# Patient Record
Sex: MALE | Race: WHITE | NOT HISPANIC OR LATINO
[De-identification: names, ages, dates, MRNs, and addresses within clinical notes are randomized per-mention and may not be internally consistent; named-entity substitution may affect disease eponyms.]

---

## 2019-04-16 ENCOUNTER — TRANSCRIPTION ENCOUNTER (OUTPATIENT)
Age: 74
End: 2019-04-16

## 2022-04-22 PROBLEM — Z00.00 ENCOUNTER FOR PREVENTIVE HEALTH EXAMINATION: Status: ACTIVE | Noted: 2022-04-22

## 2022-05-23 ENCOUNTER — APPOINTMENT (OUTPATIENT)
Dept: ORTHOPEDIC SURGERY | Facility: CLINIC | Age: 77
End: 2022-05-23
Payer: MEDICARE

## 2022-05-23 VITALS — WEIGHT: 128 LBS | BODY MASS INDEX: 23.55 KG/M2 | HEIGHT: 62 IN

## 2022-05-23 PROCEDURE — 99214 OFFICE O/P EST MOD 30 MIN: CPT

## 2022-05-23 NOTE — HISTORY OF PRESENT ILLNESS
[Gradual] : gradual [Result of repetitive motion] : result of repetitive motion [8] : 8 [1] : 2 [Dull/Aching] : dull/aching [Occasional] : occasional [Stairs] : stairs [de-identified] : 5/23/22: Tried PT without relief.  Cont pain.\par \par Previous doc:\par 5/20/20: Mr. Agustin Puri, a 74-year-old male, presents today for right hip pain intermittent for several years. States he was dancing at his daughters wedding in 2016 which helped his pain and since then whenever he gets pain it is relieved by exercise. Has period of pain daily but gets better with activity. Starts lateral hip and radiates to right ant thigh and knee. No treatments other than home exercises. No groin pain. 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 Minimal pain. Did not start PT.\par 9/13 rt hip is still giving him pain - moderate pain but feel it does affect him and prevents him from doing things - occ mobic helps but tries not to take it everyday -\par 11/8/21: He continues to have hip pain. He was under the impression that he would be getting his hip injection today.\par 12/14/21: Hip inj helped (11/23). Min pain at this time.\par 3/14/22: Hip inj only helped for 2 weeks. [] : no

## 2022-05-23 NOTE — ASSESSMENT
[FreeTextEntry1] : 5/20/20: Adv OA right hip with poor gait but denies severe pain. 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 Discussed options and will try PT and NSAIDs to start.\par 6/22/20: Has minimal pain with occasional Mobic. He is functioning very well at this time and is not interested in further treatment.\par 9/13/21 cont to have pain- affecting his life-- I will plan for inj and PT - and we did discuss DUKE but he will be on Chemo for the rest of his life so will have to discuss with oncologist\par 11/8/21: He has not spoken to his oncologist about the DUKE yet. He has a bone marrow biopsy 11/16/21. planning hip injection\par 12/14/21: Doing well since hip inj - will cont to monitor. Trying to avoid DUKE due to ongoing chemo for AML.\par 3/14/22: Only 2 weeks relief from inj - not a candidate for DUKE at this time due to low WBC count from AML and is on lifelong chemo. Discussed repeat cortisone vs PRP - for now will try some formal PT and he will discuss possibility of DUKE with his oncologist.\par \par 5/23/22: No relief with PT.  He discussed possibility of DUKE with his oncologist and they said his WBC numbers are trending up and could possibly have surgery in the fall.  For now will try repeat hip inj before his vacation in july.  Understands he cannot have DUKE for 3 months after the inj.

## 2022-05-23 NOTE — DISCUSSION/SUMMARY
[de-identified] : We discussed hip injection and its diagnostic and therapeutic benefits.  In the operating room I will inject a combination of Depomedrol and Marcaine.  The patient will keep a log of their pain relief after the injection.  We will discuss the benefit at a one to two weeks follow-up.  Patient made aware that they may be referred to pain management for this injection\par  Yes

## 2022-05-24 DIAGNOSIS — M79.10 MYALGIA, UNSPECIFIED SITE: ICD-10-CM

## 2022-07-09 ENCOUNTER — NON-APPOINTMENT (OUTPATIENT)
Age: 77
End: 2022-07-09

## 2022-07-11 ENCOUNTER — APPOINTMENT (OUTPATIENT)
Dept: ORTHOPEDIC SURGERY | Facility: CLINIC | Age: 77
End: 2022-07-11

## 2022-07-14 ENCOUNTER — APPOINTMENT (OUTPATIENT)
Age: 77
End: 2022-07-14

## 2022-07-14 PROCEDURE — J3490M: CUSTOM

## 2022-07-14 PROCEDURE — 73525 CONTRAST X-RAY OF HIP: CPT | Mod: 26,RT

## 2022-07-14 PROCEDURE — 27093 INJECTION FOR HIP X-RAY: CPT | Mod: RT

## 2022-08-08 ENCOUNTER — APPOINTMENT (OUTPATIENT)
Dept: ORTHOPEDIC SURGERY | Facility: CLINIC | Age: 77
End: 2022-08-08

## 2022-08-08 VITALS — HEIGHT: 62 IN | BODY MASS INDEX: 23.55 KG/M2 | WEIGHT: 128 LBS

## 2022-08-08 DIAGNOSIS — Z87.891 PERSONAL HISTORY OF NICOTINE DEPENDENCE: ICD-10-CM

## 2022-08-08 DIAGNOSIS — E78.00 PURE HYPERCHOLESTEROLEMIA, UNSPECIFIED: ICD-10-CM

## 2022-08-08 DIAGNOSIS — M16.11 UNILATERAL PRIMARY OSTEOARTHRITIS, RIGHT HIP: ICD-10-CM

## 2022-08-08 DIAGNOSIS — C80.1 MALIGNANT (PRIMARY) NEOPLASM, UNSPECIFIED: ICD-10-CM

## 2022-08-08 PROCEDURE — 99215 OFFICE O/P EST HI 40 MIN: CPT

## 2022-08-08 NOTE — DISCUSSION/SUMMARY
[de-identified] : The patient was advised of the diagnosis.  The natural history of the pathology was explained in full to the patient in layman's terms. All questions were answered.  The risks and benefits of surgical and non-surgical treatment alternatives were explained in full to the patient.\par \par Entered by Rachel Sommers acting as scribe.\par

## 2022-08-08 NOTE — HISTORY OF PRESENT ILLNESS
[Gradual] : gradual [Result of repetitive motion] : result of repetitive motion [1] : 2 [0] : 0 [Dull/Aching] : dull/aching [Occasional] : occasional [Stairs] : stairs [de-identified] : 8/8/22: Nearly 4 weeks s/p R IA hip injection doing well with minimal pain at this time. Thinking about DUKE in the fall\par \par Previous doc:\par 5/20/20: Mr. Agustin Puri, a 74-year-old male, presents today for right hip pain intermittent for several years. States he was dancing at his daughters wedding in 2016 which helped his pain and since then whenever he gets pain it is relieved by exercise. Has period of pain daily but gets better with activity. Starts lateral hip and radiates to right ant thigh and knee. No treatments other than home exercises. No groin pain. 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 Minimal pain. Did not start PT.\par 9/13 rt hip is still giving him pain - moderate pain but feel it does affect him and prevents him from doing things - occ mobic helps but tries not to take it everyday -\par 11/8/21: He continues to have hip pain. He was under the impression that he would be getting his hip injection today.\par 12/14/21: Hip inj helped (11/23). Min pain at this time.\par 3/14/22: Hip inj only helped for 2 weeks.\par 5/23/22: Tried PT without relief.  Cont pain. [] : no

## 2022-08-08 NOTE — REASON FOR VISIT
[Spouse] : spouse [FreeTextEntry2] : 8/8/2022 Follow up for Rt Hip , pain much better after Cortisone \par

## 2022-08-08 NOTE — ASSESSMENT
[FreeTextEntry1] : 5/20/20: Adv OA right hip with poor gait but denies severe pain. 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 Discussed options and will try PT and NSAIDs to start.\par 6/22/20: Has minimal pain with occasional Mobic. He is functioning very well at this time and is not interested in further treatment.\par 9/13/21 cont to have pain- affecting his life-- I will plan for inj and PT - and we did discuss DUKE but he will be on Chemo for the rest of his life so will have to discuss with oncologist\par 11/8/21: He has not spoken to his oncologist about the DUKE yet. He has a bone marrow biopsy 11/16/21. planning hip injection\par 12/14/21: Doing well since hip inj - will cont to monitor. Trying to avoid DUKE due to ongoing chemo for AML.\par 3/14/22: Only 2 weeks relief from inj - not a candidate for DUKE at this time due to low WBC count from AML and is on lifelong chemo. Discussed repeat cortisone vs PRP - for now will try some formal PT and he will discuss possibility of DUKE with his oncologist.\par 5/23/22: No relief with PT.  He discussed possibility of DUKE with his oncologist and they said his WBC numbers are trending up and could possibly have surgery in the fall.  For now will try repeat hip inj before his vacation in july.  Understands he cannot have DUKE for 3 months after the inj.\par \par 8/8/22: Discussed at length with patient and his wife risks and benefits of DUKE. At this point, patient is significantly debilitated  and would possibly like to proceed with DUKE pending WBC count and okay from oncologist. Will speak with oncologist this week and come up with a surgical plan.

## 2022-09-29 ENCOUNTER — OUTPATIENT (OUTPATIENT)
Dept: OUTPATIENT SERVICES | Facility: HOSPITAL | Age: 77
LOS: 1 days | Discharge: HOME | End: 2022-09-29

## 2022-09-29 VITALS
RESPIRATION RATE: 18 BRPM | HEIGHT: 62 IN | HEART RATE: 64 BPM | TEMPERATURE: 98 F | SYSTOLIC BLOOD PRESSURE: 142 MMHG | OXYGEN SATURATION: 98 % | DIASTOLIC BLOOD PRESSURE: 68 MMHG | WEIGHT: 135.14 LBS

## 2022-09-29 DIAGNOSIS — M16.11 UNILATERAL PRIMARY OSTEOARTHRITIS, RIGHT HIP: ICD-10-CM

## 2022-09-29 DIAGNOSIS — Z01.818 ENCOUNTER FOR OTHER PREPROCEDURAL EXAMINATION: ICD-10-CM

## 2022-09-29 DIAGNOSIS — E78.5 HYPERLIPIDEMIA, UNSPECIFIED: ICD-10-CM

## 2022-09-29 DIAGNOSIS — C92.00 ACUTE MYELOBLASTIC LEUKEMIA, NOT HAVING ACHIEVED REMISSION: ICD-10-CM

## 2022-09-29 LAB
A1C WITH ESTIMATED AVERAGE GLUCOSE RESULT: 5.4 % — SIGNIFICANT CHANGE UP (ref 4–5.6)
ALBUMIN SERPL ELPH-MCNC: 4.1 G/DL — SIGNIFICANT CHANGE UP (ref 3.3–5)
ALP SERPL-CCNC: 65 U/L — SIGNIFICANT CHANGE UP (ref 40–120)
ALT FLD-CCNC: 15 U/L — SIGNIFICANT CHANGE UP (ref 12–78)
ANION GAP SERPL CALC-SCNC: 6 MMOL/L — SIGNIFICANT CHANGE UP (ref 5–17)
APTT BLD: 36.4 SEC — HIGH (ref 27.5–35.5)
AST SERPL-CCNC: 17 U/L — SIGNIFICANT CHANGE UP (ref 15–37)
BASOPHILS # BLD AUTO: 0.02 K/UL — SIGNIFICANT CHANGE UP (ref 0–0.2)
BASOPHILS NFR BLD AUTO: 0.5 % — SIGNIFICANT CHANGE UP (ref 0–2)
BILIRUB SERPL-MCNC: 0.6 MG/DL — SIGNIFICANT CHANGE UP (ref 0.2–1.2)
BLD GP AB SCN SERPL QL: SIGNIFICANT CHANGE UP
BUN SERPL-MCNC: 11 MG/DL — SIGNIFICANT CHANGE UP (ref 7–23)
CALCIUM SERPL-MCNC: 9.4 MG/DL — SIGNIFICANT CHANGE UP (ref 8.5–10.1)
CHLORIDE SERPL-SCNC: 108 MMOL/L — SIGNIFICANT CHANGE UP (ref 96–108)
CO2 SERPL-SCNC: 27 MMOL/L — SIGNIFICANT CHANGE UP (ref 22–31)
CREAT SERPL-MCNC: 0.81 MG/DL — SIGNIFICANT CHANGE UP (ref 0.5–1.3)
EGFR: 92 ML/MIN/1.73M2 — SIGNIFICANT CHANGE UP
EOSINOPHIL # BLD AUTO: 0.07 K/UL — SIGNIFICANT CHANGE UP (ref 0–0.5)
EOSINOPHIL NFR BLD AUTO: 1.8 % — SIGNIFICANT CHANGE UP (ref 0–6)
ESTIMATED AVERAGE GLUCOSE: 108 MG/DL — SIGNIFICANT CHANGE UP (ref 68–114)
GLUCOSE SERPL-MCNC: 101 MG/DL — HIGH (ref 70–99)
HCT VFR BLD CALC: 39.7 % — SIGNIFICANT CHANGE UP (ref 39–50)
HGB BLD-MCNC: 13.1 G/DL — SIGNIFICANT CHANGE UP (ref 13–17)
IMM GRANULOCYTES NFR BLD AUTO: 0.3 % — SIGNIFICANT CHANGE UP (ref 0–0.9)
INR BLD: 1.09 RATIO — SIGNIFICANT CHANGE UP (ref 0.88–1.16)
LYMPHOCYTES # BLD AUTO: 0.78 K/UL — LOW (ref 1–3.3)
LYMPHOCYTES # BLD AUTO: 20.5 % — SIGNIFICANT CHANGE UP (ref 13–44)
MCHC RBC-ENTMCNC: 32.8 PG — SIGNIFICANT CHANGE UP (ref 27–34)
MCHC RBC-ENTMCNC: 33 G/DL — SIGNIFICANT CHANGE UP (ref 32–36)
MCV RBC AUTO: 99.3 FL — SIGNIFICANT CHANGE UP (ref 80–100)
MONOCYTES # BLD AUTO: 0.11 K/UL — SIGNIFICANT CHANGE UP (ref 0–0.9)
MONOCYTES NFR BLD AUTO: 2.9 % — SIGNIFICANT CHANGE UP (ref 2–14)
MRSA PCR RESULT.: SIGNIFICANT CHANGE UP
NEUTROPHILS # BLD AUTO: 2.81 K/UL — SIGNIFICANT CHANGE UP (ref 1.8–7.4)
NEUTROPHILS NFR BLD AUTO: 74 % — SIGNIFICANT CHANGE UP (ref 43–77)
NRBC # BLD: 0 /100 WBCS — SIGNIFICANT CHANGE UP (ref 0–0)
PLATELET # BLD AUTO: 81 K/UL — LOW (ref 150–400)
POTASSIUM SERPL-MCNC: 4.3 MMOL/L — SIGNIFICANT CHANGE UP (ref 3.5–5.3)
POTASSIUM SERPL-SCNC: 4.3 MMOL/L — SIGNIFICANT CHANGE UP (ref 3.5–5.3)
PROT SERPL-MCNC: 7.3 GM/DL — SIGNIFICANT CHANGE UP (ref 6–8.3)
PROTHROM AB SERPL-ACNC: 13.1 SEC — SIGNIFICANT CHANGE UP (ref 10.5–13.4)
RBC # BLD: 4 M/UL — LOW (ref 4.2–5.8)
RBC # FLD: 13.9 % — SIGNIFICANT CHANGE UP (ref 10.3–14.5)
S AUREUS DNA NOSE QL NAA+PROBE: SIGNIFICANT CHANGE UP
SODIUM SERPL-SCNC: 141 MMOL/L — SIGNIFICANT CHANGE UP (ref 135–145)
TSH SERPL-MCNC: 2.03 UU/ML — SIGNIFICANT CHANGE UP (ref 0.36–3.74)
VIT D25+D1,25 OH+D1,25 PNL SERPL-MCNC: 47.4 PG/ML — SIGNIFICANT CHANGE UP (ref 19.9–79.3)
WBC # BLD: 3.8 K/UL — SIGNIFICANT CHANGE UP (ref 3.8–10.5)
WBC # FLD AUTO: 3.8 K/UL — SIGNIFICANT CHANGE UP (ref 3.8–10.5)

## 2022-09-29 PROCEDURE — 93010 ELECTROCARDIOGRAM REPORT: CPT

## 2022-09-29 NOTE — OCCUPATIONAL THERAPY INITIAL EVALUATION ADULT - GENERAL OBSERVATIONS, REHAB EVAL
Chart reviewed. Patient encountered seated in waiting area with NAD. Patient underwent occupational therapy pre-operative consultation to determine current functional limitations in order to recommend appropriate DME & educate patient on post op OT services.

## 2022-09-29 NOTE — H&P PST ADULT - HEALTH CARE MAINTENANCE
Moderna x2, Moderna booster x1 Pfizer booster x1   colonscopy- 2 years ago ok Moderna x2, Moderna booster x1 Pfizer booster x1   colonoscopy- 2 years ago ok

## 2022-09-29 NOTE — PHYSICAL THERAPY INITIAL EVALUATION ADULT - PERTINENT HX OF CURRENT PROBLEM, REHAB EVAL
Patient attends pre-op testing today following consult c Dr. Tolentino due to chronic pain to right hip. Elective R DUKE is now scheduled in this facility for 10/19/2022.

## 2022-09-29 NOTE — PHYSICAL THERAPY INITIAL EVALUATION ADULT - ADDITIONAL COMMENTS
Pt reports he lives with spouse (who can provide support post op) in a private house with 1 step (no rails but can fit walker) plus 5 steps (left rail) plus 1 threshold step to enter. At the garage entrance, pt has 6 steps with L rail up + 6 steps c R rail up. Pt has 6 additional steps inside the house with L rail up to reach bedroom. Pt has access to tub/shower combo, retractable shower head & comfort height toilet. Pt states that a 3:1 commode will fit in bathroom at home. Pt has a shower chair and a cane at home. Pt is independent with ADLs and mobility but has 7/10 pain with activity. Pt has no recent PT, no recent falls & occasional leg buckling. Pt wears glasses, is right handed, drives, retired, & wears b/l hearing aids. Pt denies taking narcotics for pain management.

## 2022-09-29 NOTE — H&P PST ADULT - HISTORY OF PRESENT ILLNESS
77 year old male with a past medical history of AML c/o pain and limited ROM to right hip secondary to osteoarthiris.  He is scheduled for  77 year old male with a past medical history of AML c/o pain and limited ROM to right hip secondary to osteoarthritis  He is scheduled for a right total hip arthroplasty on 10/19/2022.    He denies fever, cough, SOB, recent travels, and sick contacts.    Goal: to walk without pain.

## 2022-09-29 NOTE — OCCUPATIONAL THERAPY INITIAL EVALUATION ADULT - PERTINENT HX OF CURRENT PROBLEM, REHAB EVAL
Pain and OA right hip. Pt is scheduled for right DUKE with Dr. Tolentino at [facility] on 10/19/22.

## 2022-09-29 NOTE — H&P PST ADULT - PROBLEM SELECTOR PLAN 2
labs - cbc,pt/ptt,bmp,t&s,nose cx,ekg  Medical and oncology clearance required  preop 3 day Hibiclens instruction reviewed and given .instructed on if  nose cx positive use Mupirocin 5 days and checklist given  take routine meds DOS with sips of water. avoid NSAID and OTC supplements. verbalized understanding  information on proper nutrition , increase protein and better food choices provided in packet   ensure clear provided  anesthesiologist to review PST labs, EKG, medical and oncology clearance and optimization for surgery

## 2022-09-29 NOTE — H&P PST ADULT - ASSESSMENT
77 year old male with a past medical history of AML c/o pain and limited ROM to right hip secondary to osteoarthritis  He is scheduled for a right total hip arthroplasty on 10/19/2022.    CAPRINI SCORE [CLOT]    AGE RELATED RISK FACTORS                                                       MOBILITY RELATED FACTORS  [ ] Age 41-60 years                                            (1 Point)                  [ ] Bed rest                                                        (1 Point)  [ ] Age: 61-74 years                                           (2 Points)                 [ ] Plaster cast                                                   (2 Points)  [x ] Age= 75 years                                              (3 Points)                 [ ] Bed bound for more than 72 hours                 (2 Points)    DISEASE RELATED RISK FACTORS                                               GENDER SPECIFIC FACTORS  [ ] Edema in the lower extremities                       (1 Point)                  [ ] Pregnancy                                                     (1 Point)  [ ] Varicose veins                                               (1 Point)                  [ ] Post-partum < 6 weeks                                   (1 Point)             [ ] BMI > 25 Kg/m2                                            (1 Point)                  [ ] Hormonal therapy  or oral contraception          (1 Point)                 [ ] Sepsis (in the previous month)                        (1 Point)                  [ ] History of pregnancy complications                 (1 point)  [ ] Pneumonia or serious lung disease                                               [ ] Unexplained or recurrent                     (1 Point)           (in the previous month)                               (1 Point)  [ ] Abnormal pulmonary function test                     (1 Point)                 SURGERY RELATED RISK FACTORS  [ ] Acute myocardial infarction                              (1 Point)                 [ ]  Section                                             (1 Point)  [ ] Congestive heart failure (in the previous month)  (1 Point)               [ ] Minor surgery                                                  (1 Point)   [ ] Inflammatory bowel disease                             (1 Point)                 [ ] Arthroscopic surgery                                        (2 Points)  [ ] Central venous access                                      (2 Points)                [ ] General surgery lasting more than 45 minutes   (2 Points)       [ ] Stroke (in the previous month)                          (5 Points)               [ x] Elective arthroplasty                                         (5 Points)                                                                                                                                               HEMATOLOGY RELATED FACTORS                                                 TRAUMA RELATED RISK FACTORS  [ ] Prior episodes of VTE                                     (3 Points)                [ ] Fracture of the hip, pelvis, or leg                       (5 Points)  [ ] Positive family history for VTE                         (3 Points)                 [ ] Acute spinal cord injury (in the previous month)  (5 Points)  [ ] Prothrombin 36756 A                                     (3 Points)                 [ ] Paralysis  (less than 1 month)                             (5 Points)  [ ] Factor V Leiden                                             (3 Points)                  [ ] Multiple Trauma within 1 month                        (5 Points)  [ ] Lupus anticoagulants                                     (3 Points)                                                           [ ] Anticardiolipin antibodies                               (3 Points)                                                       [ ] High homocysteine in the blood                      (3 Points)                                             [ ] Other congenital or acquired thrombophilia      (3 Points)                                                [ ] Heparin induced thrombocytopenia                  (3 Points)                                          Total Score [     8     ]    Caprini Score 0 - 2:  Low Risk, No VTE Prophylaxis required for most patients, encourage ambulation  Caprini Score 3 - 6:  At Risk, pharmacologic VTE prophylaxis is indicated for most patients (in the absence of a contraindication)  Caprini Score Greater than or = 7:  High Risk, pharmacologic VTE prophylaxis is indicated for most patients (in the absence of a contraindication)    Caprini score indicates that the patient is high risk for VTE event ( score 6 or greater). Surgical patient's in this group will benefit from both pharmacologic prophylaxis and intermittent compression devices . Surgical team will determine the balance between VTE  risk and bleeding risk and other clinical considerations

## 2022-09-29 NOTE — OCCUPATIONAL THERAPY INITIAL EVALUATION ADULT - ADDITIONAL COMMENTS
Pt reports he lives with spouse (who can provide support post op) in a private house with 1 step (no rails but can fit walker) plus 5 steps (left rail) plus 1 threshold step to enter. At the garage entrance, pt has 6 steps with a rail to enter. Pt has 6 additional steps inside the house with left rail to reach bedroom. Pt has access to tub/shower combo, retractable shower head & comfort height toilet. Pt states that a 3:1 commode will fit in bathroom at home. Pt has a shower chair and a cane at home. Pt is independent with ADLs and mobility but has 7/10 pain with activity. Pt has no recent PT, no recent falls & occasional leg buckling. Pt wears glasses, is right handed, drives & wears b/l hearing aids.

## 2022-09-29 NOTE — PHYSICAL THERAPY INITIAL EVALUATION ADULT - SINGLE LEG BALANCE TEST, REHAB EVAL
Patient has been notified of his normal labs including his blood sugar. Is able to proceed with the CT scan of the Abdomen/Pelvis. I also did give the patient the number to call to schedule the CT scan. Patient appreciated the call.   One Leg Stand Test (OLST): Right: 2 secs, Left: 7 secs.

## 2022-10-18 ENCOUNTER — FORM ENCOUNTER (OUTPATIENT)
Age: 77
End: 2022-10-18

## 2022-10-21 PROBLEM — E78.5 HYPERLIPIDEMIA, UNSPECIFIED: Chronic | Status: ACTIVE | Noted: 2022-09-29

## 2022-10-21 PROBLEM — C92.00 ACUTE MYELOBLASTIC LEUKEMIA, NOT HAVING ACHIEVED REMISSION: Chronic | Status: ACTIVE | Noted: 2022-09-29

## 2022-11-01 DIAGNOSIS — C92.00 ACUTE MYELOBLASTIC LEUKEMIA, NOT HAVING ACHIEVED REMISSION: ICD-10-CM

## 2022-11-08 ENCOUNTER — OUTPATIENT (OUTPATIENT)
Dept: OUTPATIENT SERVICES | Facility: HOSPITAL | Age: 77
LOS: 1 days | Discharge: HOME | End: 2022-11-08

## 2022-11-08 VITALS
HEART RATE: 67 BPM | OXYGEN SATURATION: 96 % | RESPIRATION RATE: 16 BRPM | TEMPERATURE: 97 F | HEIGHT: 62 IN | DIASTOLIC BLOOD PRESSURE: 75 MMHG | WEIGHT: 133.38 LBS | SYSTOLIC BLOOD PRESSURE: 138 MMHG

## 2022-11-08 DIAGNOSIS — E78.5 HYPERLIPIDEMIA, UNSPECIFIED: ICD-10-CM

## 2022-11-08 DIAGNOSIS — C92.00 ACUTE MYELOBLASTIC LEUKEMIA, NOT HAVING ACHIEVED REMISSION: ICD-10-CM

## 2022-11-08 DIAGNOSIS — M16.11 UNILATERAL PRIMARY OSTEOARTHRITIS, RIGHT HIP: ICD-10-CM

## 2022-11-08 DIAGNOSIS — Z01.818 ENCOUNTER FOR OTHER PREPROCEDURAL EXAMINATION: ICD-10-CM

## 2022-11-08 LAB
A1C WITH ESTIMATED AVERAGE GLUCOSE RESULT: 5.3 % — SIGNIFICANT CHANGE UP (ref 4–5.6)
ALBUMIN SERPL ELPH-MCNC: 4.1 G/DL — SIGNIFICANT CHANGE UP (ref 3.3–5)
ALP SERPL-CCNC: 68 U/L — SIGNIFICANT CHANGE UP (ref 40–120)
ALT FLD-CCNC: 15 U/L — SIGNIFICANT CHANGE UP (ref 12–78)
ANION GAP SERPL CALC-SCNC: 3 MMOL/L — LOW (ref 5–17)
APTT BLD: 32.5 SEC — SIGNIFICANT CHANGE UP (ref 27.5–35.5)
AST SERPL-CCNC: 13 U/L — LOW (ref 15–37)
BASOPHILS # BLD AUTO: 0.02 K/UL — SIGNIFICANT CHANGE UP (ref 0–0.2)
BASOPHILS NFR BLD AUTO: 0.5 % — SIGNIFICANT CHANGE UP (ref 0–2)
BILIRUB SERPL-MCNC: 0.4 MG/DL — SIGNIFICANT CHANGE UP (ref 0.2–1.2)
BLD GP AB SCN SERPL QL: SIGNIFICANT CHANGE UP
BUN SERPL-MCNC: 14 MG/DL — SIGNIFICANT CHANGE UP (ref 7–23)
CALCIUM SERPL-MCNC: 9.2 MG/DL — SIGNIFICANT CHANGE UP (ref 8.5–10.1)
CHLORIDE SERPL-SCNC: 106 MMOL/L — SIGNIFICANT CHANGE UP (ref 96–108)
CO2 SERPL-SCNC: 31 MMOL/L — SIGNIFICANT CHANGE UP (ref 22–31)
CREAT SERPL-MCNC: 0.75 MG/DL — SIGNIFICANT CHANGE UP (ref 0.5–1.3)
EGFR: 94 ML/MIN/1.73M2 — SIGNIFICANT CHANGE UP
EOSINOPHIL # BLD AUTO: 0.09 K/UL — SIGNIFICANT CHANGE UP (ref 0–0.5)
EOSINOPHIL NFR BLD AUTO: 2.1 % — SIGNIFICANT CHANGE UP (ref 0–6)
ESTIMATED AVERAGE GLUCOSE: 105 MG/DL — SIGNIFICANT CHANGE UP (ref 68–114)
GLUCOSE SERPL-MCNC: 87 MG/DL — SIGNIFICANT CHANGE UP (ref 70–99)
HCT VFR BLD CALC: 40.5 % — SIGNIFICANT CHANGE UP (ref 39–50)
HGB BLD-MCNC: 13.3 G/DL — SIGNIFICANT CHANGE UP (ref 13–17)
IMM GRANULOCYTES NFR BLD AUTO: 0.5 % — SIGNIFICANT CHANGE UP (ref 0–0.9)
INR BLD: 1.07 RATIO — SIGNIFICANT CHANGE UP (ref 0.88–1.16)
LYMPHOCYTES # BLD AUTO: 0.84 K/UL — LOW (ref 1–3.3)
LYMPHOCYTES # BLD AUTO: 19.7 % — SIGNIFICANT CHANGE UP (ref 13–44)
MCHC RBC-ENTMCNC: 32.8 G/DL — SIGNIFICANT CHANGE UP (ref 32–36)
MCHC RBC-ENTMCNC: 33.5 PG — SIGNIFICANT CHANGE UP (ref 27–34)
MCV RBC AUTO: 102 FL — HIGH (ref 80–100)
MONOCYTES # BLD AUTO: 0.31 K/UL — SIGNIFICANT CHANGE UP (ref 0–0.9)
MONOCYTES NFR BLD AUTO: 7.3 % — SIGNIFICANT CHANGE UP (ref 2–14)
NEUTROPHILS # BLD AUTO: 2.99 K/UL — SIGNIFICANT CHANGE UP (ref 1.8–7.4)
NEUTROPHILS NFR BLD AUTO: 69.9 % — SIGNIFICANT CHANGE UP (ref 43–77)
NRBC # BLD: 0 /100 WBCS — SIGNIFICANT CHANGE UP (ref 0–0)
PLATELET # BLD AUTO: 131 K/UL — LOW (ref 150–400)
POTASSIUM SERPL-MCNC: 4.3 MMOL/L — SIGNIFICANT CHANGE UP (ref 3.5–5.3)
POTASSIUM SERPL-SCNC: 4.3 MMOL/L — SIGNIFICANT CHANGE UP (ref 3.5–5.3)
PROT SERPL-MCNC: 7.7 GM/DL — SIGNIFICANT CHANGE UP (ref 6–8.3)
PROTHROM AB SERPL-ACNC: 12.8 SEC — SIGNIFICANT CHANGE UP (ref 10.5–13.4)
RBC # BLD: 3.97 M/UL — LOW (ref 4.2–5.8)
RBC # FLD: 13.8 % — SIGNIFICANT CHANGE UP (ref 10.3–14.5)
SODIUM SERPL-SCNC: 140 MMOL/L — SIGNIFICANT CHANGE UP (ref 135–145)
VIT D25+D1,25 OH+D1,25 PNL SERPL-MCNC: 31.9 PG/ML — SIGNIFICANT CHANGE UP (ref 19.9–79.3)
WBC # BLD: 4.27 K/UL — SIGNIFICANT CHANGE UP (ref 3.8–10.5)
WBC # FLD AUTO: 4.27 K/UL — SIGNIFICANT CHANGE UP (ref 3.8–10.5)

## 2022-11-08 NOTE — H&P PST ADULT - PROBLEM SELECTOR PLAN 1
Labs-CBC, BMP, PT/INR, PTT ,T&S, Nose Cx, EKG   Medical/hemeonc clearance required    Preop Hibiclens x 3 day instructions reviewed and given. Instructed on if Cx is positive use Mupirocin 5 days and checklist given in booklet   Take routine meds DOS with small sips of water, avoid NSAIDs and OTC supplements, verbalized understanding information on proper nutrition, increase protein and better food choices provided in booklet.    Ensure clear given   Pt aware COVID-19 PCR test needed 3-5 days prior to surgery   Anesthesiologist to review PST labs, EKG, required clearances, and optimization for surgery

## 2022-11-08 NOTE — H&P PST ADULT - NSICDXPASTSURGICALHX_GEN_ALL_CORE_FT
PAST SURGICAL HISTORY:  No significant past surgical history PAST SURGICAL HISTORY:  History of tonsillectomy

## 2022-11-08 NOTE — H&P PST ADULT - HISTORY OF PRESENT ILLNESS
77 year old male with a past medical history of AML c/o pain and limited ROM to right hip secondary to osteoarthritis  He is scheduled for a right total hip arthroplasty on     He denies fever, cough, SOB, recent travels, and sick contacts.    Goal: to walk without pain.   78 y/o M PMH AML c/o right pain and limited ROM to right hip secondary to osteoarthritis and is scheduled right total hip arthroplasty on 11/16/22 with .     He denies fever, cough, SOB, recent travels, and sick contacts. Pt reports he had COVID first week of October 2022.     Goal: to walk without pain.

## 2022-11-08 NOTE — H&P PST ADULT - ASSESSMENT
76 y/o M PMH AML c/o right pain and limited ROM to right hip secondary to osteoarthritis and is scheduled right total hip arthroplasty on 22 with .     CESARI SCORE [CLOT]    AGE RELATED RISK FACTORS                                                       MOBILITY RELATED FACTORS  [ ] Age 41-60 years                                            (1 Point)                  [ ] Bed rest                                                        (1 Point)  [ ] Age: 61-74 years                                           (2 Points)                 [ ] Plaster cast                                                   (2 Points)  [ x] Age= 75 years                                              (3 Points)                 [ ] Bed bound for more than 72 hours                 (2 Points)    DISEASE RELATED RISK FACTORS                                               GENDER SPECIFIC FACTORS  [ ] Edema in the lower extremities                       (1 Point)                  [ ] Pregnancy                                                     (1 Point)  [ ] Varicose veins                                               (1 Point)                  [ ] Post-partum < 6 weeks                                   (1 Point)             [ ] BMI > 25 Kg/m2                                            (1 Point)                  [ ] Hormonal therapy  or oral contraception          (1 Point)                 [ ] Sepsis (in the previous month)                        (1 Point)                  [ ] History of pregnancy complications                 (1 point)  [ ] Pneumonia or serious lung disease                                               [ ] Unexplained or recurrent                     (1 Point)           (in the previous month)                               (1 Point)  [ ] Abnormal pulmonary function test                     (1 Point)                 SURGERY RELATED RISK FACTORS  [ ] Acute myocardial infarction                              (1 Point)                 [ ]  Section                                             (1 Point)  [ ] Congestive heart failure (in the previous month)  (1 Point)               [ ] Minor surgery                                                  (1 Point)   [ ] Inflammatory bowel disease                             (1 Point)                 [ ] Arthroscopic surgery                                        (2 Points)  [ ] Central venous access                                      (2 Points)                [ ] General surgery lasting more than 45 minutes   (2 Points)       [ ] Stroke (in the previous month)                          (5 Points)               [x ] Elective arthroplasty                                         (5 Points)                                                                                                                                               HEMATOLOGY RELATED FACTORS                                                 TRAUMA RELATED RISK FACTORS  [ ] Prior episodes of VTE                                     (3 Points)                [ ] Fracture of the hip, pelvis, or leg                       (5 Points)  [ ] Positive family history for VTE                         (3 Points)                 [ ] Acute spinal cord injury (in the previous month)  (5 Points)  [ ] Prothrombin 45743 A                                     (3 Points)                 [ ] Paralysis  (less than 1 month)                             (5 Points)  [ ] Factor V Leiden                                             (3 Points)                  [ ] Multiple Trauma within 1 month                        (5 Points)  [ ] Lupus anticoagulants                                     (3 Points)                                                           [ ] Anticardiolipin antibodies                               (3 Points)                                                       [ ] High homocysteine in the blood                      (3 Points)                                             [ ] Other congenital or acquired thrombophilia      (3 Points)                                                [ ] Heparin induced thrombocytopenia                  (3 Points)                                          Total Score [ 8 ]    Caprini Score 0 - 2:  Low Risk, No VTE Prophylaxis required for most patients, encourage ambulation  Caprini Score 3 - 6:  At Risk, pharmacologic VTE prophylaxis is indicated for most patients (in the absence of a contraindication)  Caprini Score Greater than or = 7:  High Risk, pharmacologic VTE prophylaxis is indicated for most patients (in the absence of a contraindication)

## 2022-11-09 LAB
MRSA PCR RESULT.: SIGNIFICANT CHANGE UP
S AUREUS DNA NOSE QL NAA+PROBE: SIGNIFICANT CHANGE UP

## 2022-11-15 ENCOUNTER — TRANSCRIPTION ENCOUNTER (OUTPATIENT)
Age: 77
End: 2022-11-15

## 2022-11-16 ENCOUNTER — OUTPATIENT (OUTPATIENT)
Dept: OUTPATIENT SERVICES | Facility: HOSPITAL | Age: 77
LOS: 1 days | Discharge: HOME HEALTH | End: 2022-11-16

## 2022-11-16 ENCOUNTER — TRANSCRIPTION ENCOUNTER (OUTPATIENT)
Age: 77
End: 2022-11-16

## 2022-11-16 ENCOUNTER — APPOINTMENT (OUTPATIENT)
Dept: ORTHOPEDIC SURGERY | Facility: HOSPITAL | Age: 77
End: 2022-11-16

## 2022-11-16 PROCEDURE — 27130 TOTAL HIP ARTHROPLASTY: CPT | Mod: RT

## 2022-11-16 PROCEDURE — 27130 TOTAL HIP ARTHROPLASTY: CPT | Mod: AS,RT

## 2022-11-16 RX ORDER — ACYCLOVIR SODIUM 500 MG
1 VIAL (EA) INTRAVENOUS
Qty: 0 | Refills: 0 | DISCHARGE

## 2022-11-16 RX ORDER — VORICONAZOLE 10 MG/ML
1 INJECTION, POWDER, LYOPHILIZED, FOR SOLUTION INTRAVENOUS
Qty: 0 | Refills: 0 | DISCHARGE

## 2022-11-16 RX ORDER — CHOLECALCIFEROL (VITAMIN D3) 125 MCG
1 CAPSULE ORAL
Qty: 0 | Refills: 0 | DISCHARGE

## 2022-11-16 RX ORDER — BENZOYL PEROXIDE MICRONIZED 5.8 %
1 TOWELETTE (EA) TOPICAL
Qty: 0 | Refills: 0 | DISCHARGE

## 2022-11-16 RX ORDER — ROSUVASTATIN CALCIUM 5 MG/1
1 TABLET ORAL
Qty: 0 | Refills: 0 | DISCHARGE

## 2022-11-17 ENCOUNTER — TRANSCRIPTION ENCOUNTER (OUTPATIENT)
Age: 77
End: 2022-11-17

## 2022-11-17 RX ORDER — VENETOCLAX 100 MG/1
4 TABLET, FILM COATED ORAL
Qty: 0 | Refills: 0 | DISCHARGE

## 2022-11-17 RX ORDER — IVOSIDENIB 250 MG/1
2 TABLET, FILM COATED ORAL
Qty: 0 | Refills: 0 | DISCHARGE

## 2022-11-25 DIAGNOSIS — C92.00 ACUTE MYELOBLASTIC LEUKEMIA, NOT HAVING ACHIEVED REMISSION: ICD-10-CM

## 2022-11-25 DIAGNOSIS — M16.11 UNILATERAL PRIMARY OSTEOARTHRITIS, RIGHT HIP: ICD-10-CM

## 2022-11-25 DIAGNOSIS — E78.5 HYPERLIPIDEMIA, UNSPECIFIED: ICD-10-CM

## 2022-11-29 ENCOUNTER — APPOINTMENT (OUTPATIENT)
Dept: ORTHOPEDIC SURGERY | Facility: CLINIC | Age: 77
End: 2022-11-29

## 2022-11-29 VITALS — BODY MASS INDEX: 23.6 KG/M2 | WEIGHT: 125 LBS | HEIGHT: 61 IN

## 2022-11-29 PROCEDURE — 99024 POSTOP FOLLOW-UP VISIT: CPT

## 2022-11-29 PROCEDURE — 73503 X-RAY EXAM HIP UNI 4/> VIEWS: CPT | Mod: RT

## 2022-11-29 NOTE — HISTORY OF PRESENT ILLNESS
[2] : 2 [0] : 0 [Sharp] : sharp [Household chores] : household chores [Leisure] : leisure [Rest] : rest [Physical therapy] : physical therapy [] : Post Surgical Visit: yes [de-identified] : 11/29/22: 2 weeks postop R DUKE doing well with some pain in the hip. Started to have issues with breathing on his right side/rib area. Has less pain than prior to surgery already. \par \par Previous doc:\par 5/20/20: Mr. Agustin Puri, a 74-year-old male, presents today for right hip pain intermittent for several years. States he was dancing at his daughters wedding in 2016 which helped his pain and since then whenever he gets pain it is relieved by exercise. Has period of pain daily but gets better with activity. Starts lateral hip and radiates to right ant thigh and knee. No treatments other than home exercises. No groin pain. 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 Minimal pain. Did not start PT.\par 9/13 rt hip is still giving him pain - moderate pain but feel it does affect him and prevents him from doing things - occ mobic helps but tries not to take it everyday -\par 11/8/21: He continues to have hip pain. He was under the impression that he would be getting his hip injection today.\par 12/14/21: Hip inj helped (11/23). Min pain at this time.\par 3/14/22: Hip inj only helped for 2 weeks.\par 5/23/22: Tried PT without relief.  Cont pain.\par 8/8/22: Nearly 4 weeks s/p R IA hip injection doing well with minimal pain at this time. Thinking about DUKE in the fall [FreeTextEntry1] : Right hip [FreeTextEntry5] : Agustin Puri is a 77 year old M here for PO #1 for the right hip. He mentions having a pain when breathing, feelling is near his right hip0 unsure if related.  [de-identified] : Certain movements [de-identified] : 11/16/22 [de-identified] : 11/27/22 [de-identified] : 11/16/22 [de-identified] : Right total hip arthroplasty

## 2022-11-29 NOTE — ASSESSMENT
[FreeTextEntry1] : 5/20/20: Adv OA right hip with poor gait but denies severe pain. 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 Discussed options and will try PT and NSAIDs to start.\par 6/22/20: Has minimal pain with occasional Mobic. He is functioning very well at this time and is not interested in further treatment.\par 9/13/21 cont to have pain- affecting his life-- I will plan for inj and PT - and we did discuss DUKE but he will be on Chemo for the rest of his life so will have to discuss with oncologist\par 11/8/21: He has not spoken to his oncologist about the DUKE yet. He has a bone marrow biopsy 11/16/21. planning hip injection\par 12/14/21: Doing well since hip inj - will cont to monitor. Trying to avoid DUKE due to ongoing chemo for AML.\par 3/14/22: Only 2 weeks relief from inj - not a candidate for DUKE at this time due to low WBC count from AML and is on lifelong chemo. Discussed repeat cortisone vs PRP - for now will try some formal PT and he will discuss possibility of DUKE with his oncologist.\par 5/23/22: No relief with PT.  He discussed possibility of DUKE with his oncologist and they said his WBC numbers are trending up and could possibly have surgery in the fall.  For now will try repeat hip inj before his vacation in july.  Understands he cannot have DUKE for 3 months after the inj.\par 8/8/22: Discussed at length with patient and his wife risks and benefits of DUKE. At this point, patient is significantly debilitated  and would possibly like to proceed with DUKE pending WBC count and okay from oncologist. Will speak with oncologist this week and come up with a surgical plan. \par \par 11/29/22: 2 weeks postop R DUKE doing well with controlled pain. XR and incision look good. Will start outpatient PT and continue HEP. He does have posterior rib tenderness likely related to his positioning on the table.

## 2022-11-29 NOTE — DISCUSSION/SUMMARY
[de-identified] : The incision was inspected and was clean and dry with no drainage.  The patient was instructed to call for fevers, chills, wound drainage, wound opening, redness, or any other concerns.\par Entered by Rachel Sommers acting as scribe.\par

## 2022-11-29 NOTE — IMAGING
[de-identified] : Effected side: right\par Inc Clean and dry no drainage - dressing removed -\par Sensation intact\par Motor 5/5 to LE with some weakness to hip flexor \par +1 edema LE\par \par Xray 3 views hip/pelvis - Implants good position and well fixed - no fracture or dislocation and Leg length wnl\par \par posterior rib tenderness

## 2023-01-09 ENCOUNTER — APPOINTMENT (OUTPATIENT)
Dept: ORTHOPEDIC SURGERY | Facility: CLINIC | Age: 78
End: 2023-01-09
Payer: MEDICARE

## 2023-01-09 VITALS — WEIGHT: 128 LBS | HEIGHT: 61 IN | BODY MASS INDEX: 24.17 KG/M2

## 2023-01-09 DIAGNOSIS — Z96.641 PRESENCE OF RIGHT ARTIFICIAL HIP JOINT: ICD-10-CM

## 2023-01-09 PROCEDURE — 99024 POSTOP FOLLOW-UP VISIT: CPT

## 2023-01-09 PROCEDURE — 73503 X-RAY EXAM HIP UNI 4/> VIEWS: CPT | Mod: RT

## 2023-01-09 NOTE — HISTORY OF PRESENT ILLNESS
[0] : 0 [] : Post Surgical Visit: yes [de-identified] : 1/9/23: 8 weeks postop R DUKE doing well with minimal pain and making progress with PT. Rib tenderness has subsided since last visit. \par \par Previous doc:\par 5/20/20: Mr. Agustin Puri, a 74-year-old male, presents today for right hip pain intermittent for several years. States he was dancing at his daughters wedding in 2016 which helped his pain and since then whenever he gets pain it is relieved by exercise. Has period of pain daily but gets better with activity. Starts lateral hip and radiates to right ant thigh and knee. No treatments other than home exercises. No groin pain. 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 Minimal pain. Did not start PT.\par 9/13 rt hip is still giving him pain - moderate pain but feel it does affect him and prevents him from doing things - occ mobic helps but tries not to take it everyday -\par 11/8/21: He continues to have hip pain. He was under the impression that he would be getting his hip injection today.\par 12/14/21: Hip inj helped (11/23). Min pain at this time.\par 3/14/22: Hip inj only helped for 2 weeks.\par 5/23/22: Tried PT without relief.  Cont pain.\par 8/8/22: Nearly 4 weeks s/p R IA hip injection doing well with minimal pain at this time. Thinking about DUKE in the fall\par 11/29/22: 2 weeks postop R DUKE doing well with some pain in the hip. Started to have issues with breathing on his right side/rib area. Has less pain than prior to surgery already.  [FreeTextEntry1] : RT HIP [FreeTextEntry5] : PT is here for PO#2 s/p R DUKE. Pt is attending PT 2x weekly with much relief.  [de-identified] : PT [de-identified] : 11/16/22 [de-identified] : W[facility] W[facility]

## 2023-01-09 NOTE — ASSESSMENT
[FreeTextEntry1] : 5/20/20: Adv OA right hip with poor gait but denies severe pain. 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 Discussed options and will try PT and NSAIDs to start.\par 6/22/20: Has minimal pain with occasional Mobic. He is functioning very well at this time and is not interested in further treatment.\par 9/13/21 cont to have pain- affecting his life-- I will plan for inj and PT - and we did discuss DUKE but he will be on Chemo for the rest of his life so will have to discuss with oncologist\par 11/8/21: He has not spoken to his oncologist about the DUKE yet. He has a bone marrow biopsy 11/16/21. planning hip injection\par 12/14/21: Doing well since hip inj - will cont to monitor. Trying to avoid DUKE due to ongoing chemo for AML.\par 3/14/22: Only 2 weeks relief from inj - not a candidate for DUKE at this time due to low WBC count from AML and is on lifelong chemo. Discussed repeat cortisone vs PRP - for now will try some formal PT and he will discuss possibility of DUKE with his oncologist.\par 5/23/22: No relief with PT.  He discussed possibility of DUKE with his oncologist and they said his WBC numbers are trending up and could possibly have surgery in the fall.  For now will try repeat hip inj before his vacation in july.  Understands he cannot have DUKE for 3 months after the inj.\par 8/8/22: Discussed at length with patient and his wife risks and benefits of DUKE. At this point, patient is significantly debilitated  and would possibly like to proceed with DUKE pending WBC count and okay from oncologist. Will speak with oncologist this week and come up with a surgical plan. \par 11/29/22: 2 weeks postop R DUKE doing well with controlled pain. XR and incision look good. Will start outpatient PT and continue HEP. He does have posterior rib tenderness likely related to his positioning on the table. \par \par 1/9/23: 8 weeks postop R DUKE doing well with minimal pain in the hip. He will continue with PT and begin to transition to HEP. Discussed exercises to help with ROM. Follow up at 1 year postop. Advised of abx for dentist.

## 2023-01-09 NOTE — DISCUSSION/SUMMARY
[de-identified] : The incision was inspected and was well healed.  The patient was instructed to call for fevers, chills, wound drainage, wound opening, redness, or any other concerns. The patient was advised of the diagnosis.  The natural history of the pathology was explained in full to the patient in layman's terms. All questions were answered.  The risks and benefits of surgical and non-surgical treatment alternatives were explained in full to the patient.\par \par \par Entered by Rachel Sommers acting as scribe.\par

## 2023-01-09 NOTE — IMAGING
[de-identified] : Effected side: right\par Incision well healed \par Sensation intact\par Motor 5/5 to LE with some weakness to hip flexor \par +1 edema LE\par antalgic gait \par \par Xray 3 views hip/pelvis - Implants good position and well fixed - no fracture or dislocation and Leg length wnl\par \par